# Patient Record
Sex: FEMALE | Race: WHITE | NOT HISPANIC OR LATINO | Employment: FULL TIME | URBAN - METROPOLITAN AREA
[De-identification: names, ages, dates, MRNs, and addresses within clinical notes are randomized per-mention and may not be internally consistent; named-entity substitution may affect disease eponyms.]

---

## 2018-06-10 ENCOUNTER — HOSPITAL ENCOUNTER (EMERGENCY)
Facility: HOSPITAL | Age: 43
Discharge: HOME | End: 2018-06-10
Attending: EMERGENCY MEDICINE
Payer: COMMERCIAL

## 2018-06-10 VITALS
TEMPERATURE: 98 F | SYSTOLIC BLOOD PRESSURE: 134 MMHG | WEIGHT: 205 LBS | HEIGHT: 65 IN | OXYGEN SATURATION: 95 % | BODY MASS INDEX: 34.16 KG/M2 | DIASTOLIC BLOOD PRESSURE: 81 MMHG | RESPIRATION RATE: 16 BRPM | HEART RATE: 87 BPM

## 2018-06-10 DIAGNOSIS — R42 DIZZINESS: ICD-10-CM

## 2018-06-10 DIAGNOSIS — K11.8 PAROTID GLAND FULLNESS: Primary | ICD-10-CM

## 2018-06-10 DIAGNOSIS — M54.2 NECK PAIN ON RIGHT SIDE: ICD-10-CM

## 2018-06-10 LAB
ANION GAP SERPL CALC-SCNC: 12 MMOL/L
BASOPHILS # BLD AUTO: 0.06 K/UL
BASOPHILS NFR BLD: 0.6 %
BUN SERPL-MCNC: 7 MG/DL
CALCIUM SERPL-MCNC: 9.8 MG/DL
CHLORIDE SERPL-SCNC: 105 MMOL/L
CO2 SERPL-SCNC: 22 MMOL/L
CREAT SERPL-MCNC: 0.8 MG/DL
DIFFERENTIAL METHOD: ABNORMAL
EOSINOPHIL # BLD AUTO: 0.5 K/UL
EOSINOPHIL NFR BLD: 5.7 %
ERYTHROCYTE [DISTWIDTH] IN BLOOD BY AUTOMATED COUNT: 12.6 %
EST. GFR  (AFRICAN AMERICAN): >60 ML/MIN/1.73 M^2
EST. GFR  (NON AFRICAN AMERICAN): >60 ML/MIN/1.73 M^2
GLUCOSE SERPL-MCNC: 122 MG/DL
HCT VFR BLD AUTO: 44.8 %
HGB BLD-MCNC: 16.2 G/DL
LYMPHOCYTES # BLD AUTO: 3.1 K/UL
LYMPHOCYTES NFR BLD: 32.7 %
MCH RBC QN AUTO: 34 PG
MCHC RBC AUTO-ENTMCNC: 36.2 G/DL
MCV RBC AUTO: 94 FL
MONOCYTES # BLD AUTO: 1.1 K/UL
MONOCYTES NFR BLD: 11.1 %
NEUTROPHILS # BLD AUTO: 4.7 K/UL
NEUTROPHILS NFR BLD: 49.9 %
PLATELET # BLD AUTO: 168 K/UL
PMV BLD AUTO: 11.2 FL
POTASSIUM SERPL-SCNC: 3.9 MMOL/L
RBC # BLD AUTO: 4.76 M/UL
SODIUM SERPL-SCNC: 139 MMOL/L
WBC # BLD AUTO: 9.43 K/UL

## 2018-06-10 PROCEDURE — 25500020 PHARM REV CODE 255: Performed by: EMERGENCY MEDICINE

## 2018-06-10 PROCEDURE — 99284 EMERGENCY DEPT VISIT MOD MDM: CPT | Mod: 25

## 2018-06-10 PROCEDURE — 93005 ELECTROCARDIOGRAM TRACING: CPT

## 2018-06-10 PROCEDURE — 85025 COMPLETE CBC W/AUTO DIFF WBC: CPT

## 2018-06-10 PROCEDURE — 80048 BASIC METABOLIC PNL TOTAL CA: CPT

## 2018-06-10 PROCEDURE — 93010 ELECTROCARDIOGRAM REPORT: CPT | Mod: ,,, | Performed by: INTERNAL MEDICINE

## 2018-06-10 RX ORDER — HYDROCHLOROTHIAZIDE 12.5 MG/1
12.5 TABLET ORAL DAILY
COMMUNITY
End: 2024-02-22

## 2018-06-10 RX ORDER — CLONIDINE HYDROCHLORIDE 0.1 MG/1
0.1 TABLET ORAL
COMMUNITY

## 2018-06-10 RX ADMIN — IOHEXOL 75 ML: 350 INJECTION, SOLUTION INTRAVENOUS at 02:06

## 2018-06-10 NOTE — ED PROVIDER NOTES
"SCRIBE #1 NOTE: I, Ela Andreso, am scribing for, and in the presence of, Juany Arreola MD. I have scribed the entire note.      History      Chief Complaint   Patient presents with    Dizziness     c/o dizziness, neck pain and swelling       Review of patient's allergies indicates:   Allergen Reactions    Sulfa (sulfonamide antibiotics)         HPI   HPI    6/10/2018, 11:31 AM   History obtained from the patient      History of Present Illness: Jessica Zamarripa is a 43 y.o. female patient with PMHx of HTN who presents to the Emergency Department for mass to R side of neck which onset gradually 6 months ago. Patient reports mass causes episodes of dizziness. Patient reports feeling dizzy at work today and her employers recommended she be evaluated in the ED. Symptoms are constant and moderate in severity. The patient describes the sxs as "feels like the knot tightens and makes me dizzy". No mitigating or exacerbating factors reported. Associated sxs include dizziness and R sided neck pain. Patient denies any neck pain, vision changes, unilateral numbness or weakness, fever, chills, HA, CP, SOB, difficulty swallowing, neck stiffness, increase in size of mass, weight change, and all other sxs at this time. Patient reports daily use of cigarettes. No further complaints or concerns at this time.     Arrival mode: Personal vehicle      PCP: Alberto Warren MD     Past Medical History:  Hypertension      Past Surgical History:  Past surgical history reviewed not relevant      Family History:  Family history reviewed not relevant      Social History:  Social History    Social History Main Topics    Social History Main Topics    Smoking status: Unknown if ever smoked    Smokeless tobacco: Unknown if ever used    Alcohol Use: Unknown drinking history    Drug Use: Unknown if ever used    Sexual Activity: Unknown       ROS   Review of Systems   Constitutional: Negative for chills, fever and unexpected weight change. "   HENT: Negative for sore throat and trouble swallowing.    Respiratory: Negative for shortness of breath.    Cardiovascular: Negative for chest pain.   Gastrointestinal: Negative for nausea.   Genitourinary: Negative for dysuria.   Musculoskeletal: Positive for neck pain (R sided). Negative for back pain and neck stiffness.        (+) mass to R side of neck  (-) increase in size of mass   Skin: Negative for rash.   Neurological: Positive for dizziness. Negative for weakness and headaches.   Hematological: Does not bruise/bleed easily.   All other systems reviewed and are negative.      Physical Exam      Initial Vitals [06/10/18 1049]   BP Pulse Resp Temp SpO2   (!) 162/87 95 18 97.6 °F (36.4 °C) 98 %      MAP       112          Physical Exam  Nursing Notes and Vital Signs Reviewed.  Constitutional: Patient is in no acute distress. Well-developed and well-nourished.  Head: Atraumatic. Normocephalic.  Eyes: PERRL. EOM intact. Conjunctivae are not pale. No scleral icterus.  ENT: Mucous membranes are moist. Oropharynx is clear and symmetric.    Neck: Supple. Full ROM. No lymphadenopathy. No palpable masses.  Cardiovascular: Regular rate. Regular rhythm. No murmurs, rubs, or gallops. Distal pulses are 2+ and symmetric.  Pulmonary/Chest: No respiratory distress. Clear to auscultation bilaterally. No wheezing or rales.  Abdominal: Soft and non-distended.  There is no tenderness.  No rebound, guarding, or rigidity. Good bowel sounds.  Genitourinary: No CVA tenderness  Musculoskeletal: Moves all extremities. No obvious deformities. No edema. No calf tenderness.  Skin: Warm and dry.  Neurological:  Alert, awake, and appropriate.  Normal speech.  No acute focal neurological deficits are appreciated.  Psychiatric: Normal affect. Good eye contact. Appropriate in content.    ED Course    Procedures  ED Vital Signs:  Vitals:    06/10/18 1049 06/10/18 1120 06/10/18 1122 06/10/18 1130   BP: (!) 162/87 120/69  120/69   Pulse: 95 89  "70 91   Resp: 18      Temp: 97.6 °F (36.4 °C)      TempSrc: Oral      SpO2: 98% 96%  (!) 94%   Weight: 93 kg (205 lb)      Height: 5' 5" (1.651 m)       06/10/18 1420 06/10/18 1500   BP: (!) 148/87 134/81   Pulse: 88 87   Resp:  16   Temp:  97.5 °F (36.4 °C)   TempSrc:     SpO2: 96% 95%   Weight:     Height:         Abnormal Lab Results:  Labs Reviewed   CBC W/ AUTO DIFFERENTIAL - Abnormal; Notable for the following:        Result Value    Hemoglobin 16.2 (*)     MCH 34.0 (*)     MCHC 36.2 (*)     Mono # 1.1 (*)     All other components within normal limits   BASIC METABOLIC PANEL - Abnormal; Notable for the following:     CO2 22 (*)     Glucose 122 (*)     All other components within normal limits        All Lab Results:  Results for orders placed or performed during the hospital encounter of 06/10/18   CBC auto differential   Result Value Ref Range    WBC 9.43 3.90 - 12.70 K/uL    RBC 4.76 4.00 - 5.40 M/uL    Hemoglobin 16.2 (H) 12.0 - 16.0 g/dL    Hematocrit 44.8 37.0 - 48.5 %    MCV 94 82 - 98 fL    MCH 34.0 (H) 27.0 - 31.0 pg    MCHC 36.2 (H) 32.0 - 36.0 g/dL    RDW 12.6 11.5 - 14.5 %    Platelets 168 150 - 350 K/uL    MPV 11.2 9.2 - 12.9 fL    Gran # (ANC) 4.7 1.8 - 7.7 K/uL    Lymph # 3.1 1.0 - 4.8 K/uL    Mono # 1.1 (H) 0.3 - 1.0 K/uL    Eos # 0.5 0.0 - 0.5 K/uL    Baso # 0.06 0.00 - 0.20 K/uL    Gran% 49.9 38.0 - 73.0 %    Lymph% 32.7 18.0 - 48.0 %    Mono% 11.1 4.0 - 15.0 %    Eosinophil% 5.7 0.0 - 8.0 %    Basophil% 0.6 0.0 - 1.9 %    Differential Method Automated    Basic metabolic panel   Result Value Ref Range    Sodium 139 136 - 145 mmol/L    Potassium 3.9 3.5 - 5.1 mmol/L    Chloride 105 95 - 110 mmol/L    CO2 22 (L) 23 - 29 mmol/L    Glucose 122 (H) 70 - 110 mg/dL    BUN, Bld 7 6 - 20 mg/dL    Creatinine 0.8 0.5 - 1.4 mg/dL    Calcium 9.8 8.7 - 10.5 mg/dL    Anion Gap 12 8 - 16 mmol/L    eGFR if African American >60 >60 mL/min/1.73 m^2    eGFR if non African American >60 >60 mL/min/1.73 m^2 "       Imaging Results:  Imaging Results          CT Soft Tissue Neck W WO Contrast (Final result)  Result time 06/10/18 14:54:52    Final result by Lacho Spicer III, MD (06/10/18 14:54:52)                 Impression:      1.  AS ABOVE.  THE AIRWAY IS GROSSLY UNREMARKABLE.    2.  SLIGHT FULLNESS IN THE PAROTIDS, PERHAPS SLIGHTLY GREATER ON THE RIGHT.  MINIMAL HAZINESS ANTERIORLY.  SCATTERED LYMPH NODES.  NO SUSPICIOUS MASS.  NO UNUSUAL FLUID COLLECTION.  I DO NOT KNOW THE EXACT SITE OF CONCERN, PLEASE CORRELATE.    All CT scans at this facility use dose modulation, iterative reconstruction and/or weight based dosing when appropriate to reduce radiation dose to as low as reasonably achievable.      Electronically signed by: Lacho Spicer MD  Date:    06/10/2018  Time:    14:54             Narrative:    EXAMINATION:  CT SOFT TISSUE NECK W WO CONTRAST    CLINICAL HISTORY:  Neck mass, nonpulsatile, solitary;    TECHNIQUE:  MULTIPLANAR IMAGING SUBMITTED    COMPARISON:  NONE    FINDINGS:  MULTIPLANAR IMAGING THROUGH THE NECK.  NO GROSS ACUTE ABNORMALITY AT THE LUNG APICES.  EMPHYSEMATOUS CHANGE NOTED GREATER ON THE RIGHT.  SUPERIOR ASPECT OF THE MEDIASTINUM SHOWS NO ACUTE FINDING.  SUBMANDIBULAR AND ARE RELATIVELY SYMMETRIC.  THERE IS SCATTERED SMALL NODES IN THE NECK BILATERALLY LARGEST MEASURING UP TO BETWEEN 8 AND 9 MM IN SHORT AXIS.  THERE IS SLIGHT FULLNESS ALONG THE PAROTID GLANDS BILATERALLY WITH MINIMAL HAZINESS ANTERIORLY ALONG BOTH PAROTID.  THERE SMALL LYMPH NODES IDENTIFIED.  NO DEFINITE SUSPICIOUS MASS IS SEEN.  I DO NOT KNOW THE EXACT SITE OF INTEREST.  THIS WAS NOT MARKED ON THE CT SCAN.    NO ADDITIONAL SIGNIFICANT FINDINGS.  NASOPHARYNX IS UNREMARKABLE.  OROPHARYNX IS UNREMARKABLE.  EPIGLOTTIS IS NORMAL.  NO ABNORMALITY OF THE FALSE OR TRUE CORDS.                               The EKG was ordered, reviewed, and independently interpreted by the ED provider.  Interpretation time: 1114  Rate: 89  BPM  Rhythm: normal sinus rhythm  Interpretation: Prolonged QT. Abnormal ECG. No STEMI.         The Emergency Provider reviewed the vital signs and test results, which are outlined above.    ED Discussion     3:05 PM: Reassessed pt at this time. Discussed with pt all pertinent ED information and results. Discussed pt dx and plan of tx. Gave pt all f/u and return to the ED instructions. All questions and concerns were addressed at this time. Pt expresses understanding of information and instructions, and is comfortable with plan to discharge. Pt is stable for discharge.  Advised patient f/u with ENT.    I discussed with patient and/or family/caretaker that evaluation in the ED does not suggest any emergent or life threatening medical conditions requiring immediate intervention beyond what was provided in the ED, and I believe patient is safe for discharge.  Regardless, an unremarkable evaluation in the ED does not preclude the development or presence of a serious of life threatening condition. As such, patient was instructed to return immediately for any worsening or change in current symptoms.      ED Medication(s):  Medications   omnipaque 350 iohexol 75 mL (75 mLs Intravenous Given 6/10/18 1407)       New Prescriptions    No medications on file       Follow-up Information     Alberto Warren MD. Schedule an appointment as soon as possible for a visit in 2 days.    Specialty:  Internal Medicine  Why:  Return to the Emergency Room, If symptoms worsen  Contact information:  [id number] [address]  [phone]                     Medical Decision Making    Medical Decision Making:   Clinical Tests:   Lab Tests: Ordered and Reviewed  Radiological Study: Ordered and Reviewed  Medical Tests: Ordered and Reviewed           Scribe Attestation:   Scribe #1: I performed the above scribed service and the documentation accurately describes the services I performed. I attest to the accuracy of the note.    Attending:    Physician Attestation Statement for Scribe #1: I, Juany Arreola MD, personally performed the services described in this documentation, as scribed by Ela Nava, in my presence, and it is both accurate and complete.          Clinical Impression       ICD-10-CM ICD-9-CM   1. Parotid gland fullness K11.8 527.8   2. Dizziness R42 780.4   3. Neck pain on right side M54.2 723.1       Disposition:   Disposition: Discharged  Condition: Stable         Juany Arreola MD  06/10/18 3670

## 2018-06-10 NOTE — ED NOTES
"The pt reports having a lump inside the right side of her neck. " When the lump gets tight It pushes on my carotid and I get dizzy" pt reports it has been there for six months and today the dizziness is worse.  "

## 2024-02-22 PROBLEM — E87.6 HYPOKALEMIA: Status: ACTIVE | Noted: 2024-02-22

## 2024-02-22 PROBLEM — I10 HTN (HYPERTENSION): Status: ACTIVE | Noted: 2024-02-22

## 2024-02-22 PROBLEM — N13.9 OBSTRUCTIVE UROPATHY: Status: ACTIVE | Noted: 2024-02-22

## 2024-02-22 PROBLEM — N20.0 STAGHORN CALCULUS: Status: ACTIVE | Noted: 2024-02-22

## 2024-02-27 PROBLEM — Z46.6 FITTING AND ADJUSTMENT OF URINARY DEVICE: Status: ACTIVE | Noted: 2024-02-27

## 2024-09-08 ENCOUNTER — HOSPITAL ENCOUNTER (EMERGENCY)
Facility: HOSPITAL | Age: 49
Discharge: HOME | End: 2024-09-09
Attending: STUDENT IN AN ORGANIZED HEALTH CARE EDUCATION/TRAINING PROGRAM
Payer: COMMERCIAL

## 2024-09-08 VITALS
HEART RATE: 70 BPM | RESPIRATION RATE: 14 BRPM | WEIGHT: 189.81 LBS | BODY MASS INDEX: 31.63 KG/M2 | SYSTOLIC BLOOD PRESSURE: 141 MMHG | HEIGHT: 65 IN | DIASTOLIC BLOOD PRESSURE: 93 MMHG | OXYGEN SATURATION: 96 % | TEMPERATURE: 98 F

## 2024-09-08 DIAGNOSIS — E87.6 HYPOKALEMIA: ICD-10-CM

## 2024-09-08 DIAGNOSIS — N12 PYELONEPHRITIS OF LEFT KIDNEY: Primary | ICD-10-CM

## 2024-09-08 LAB
ALBUMIN SERPL-MCNC: 3.6 G/DL (ref 3.5–5)
ALBUMIN/GLOB SERPL: 0.8 RATIO (ref 1.1–2)
ALP SERPL-CCNC: 135 UNIT/L (ref 40–150)
ALT SERPL-CCNC: 27 UNIT/L (ref 0–55)
AMORPH URATE CRY URNS QL MICRO: ABNORMAL /HPF
ANION GAP SERPL CALC-SCNC: 10 MEQ/L
AST SERPL-CCNC: 31 UNIT/L (ref 5–34)
BACTERIA #/AREA URNS AUTO: ABNORMAL /HPF
BASOPHILS # BLD AUTO: 0.05 X10(3)/MCL
BASOPHILS NFR BLD AUTO: 1 %
BILIRUB SERPL-MCNC: 0.5 MG/DL
BILIRUB UR QL STRIP.AUTO: NEGATIVE
BUN SERPL-MCNC: 8 MG/DL (ref 9.8–20.1)
CALCIUM SERPL-MCNC: 9.6 MG/DL (ref 8.4–10.2)
CAOX CRY UR QL COMP ASSIST: ABNORMAL
CHLORIDE SERPL-SCNC: 107 MMOL/L (ref 98–107)
CLARITY UR: ABNORMAL
CO2 SERPL-SCNC: 23 MMOL/L (ref 22–29)
COLOR UR AUTO: ABNORMAL
CREAT SERPL-MCNC: 0.87 MG/DL (ref 0.55–1.02)
CREAT/UREA NIT SERPL: 9
EOSINOPHIL # BLD AUTO: 0.39 X10(3)/MCL (ref 0–0.9)
EOSINOPHIL NFR BLD AUTO: 7.6 %
ERYTHROCYTE [DISTWIDTH] IN BLOOD BY AUTOMATED COUNT: 12.7 % (ref 11.5–17)
GFR SERPLBLD CREATININE-BSD FMLA CKD-EPI: >60 ML/MIN/1.73/M2
GLOBULIN SER-MCNC: 4.5 GM/DL (ref 2.4–3.5)
GLUCOSE SERPL-MCNC: 127 MG/DL (ref 74–100)
GLUCOSE UR QL STRIP: NEGATIVE
HCT VFR BLD AUTO: 41.4 % (ref 37–47)
HGB BLD-MCNC: 14.3 G/DL (ref 12–16)
HGB UR QL STRIP: ABNORMAL
IMM GRANULOCYTES # BLD AUTO: 0.01 X10(3)/MCL (ref 0–0.04)
IMM GRANULOCYTES NFR BLD AUTO: 0.2 %
KETONES UR QL STRIP: NEGATIVE
LEUKOCYTE ESTERASE UR QL STRIP: ABNORMAL
LYMPHOCYTES # BLD AUTO: 1.11 X10(3)/MCL (ref 0.6–4.6)
LYMPHOCYTES NFR BLD AUTO: 21.6 %
MCH RBC QN AUTO: 31.5 PG (ref 27–31)
MCHC RBC AUTO-ENTMCNC: 34.5 G/DL (ref 33–36)
MCV RBC AUTO: 91.2 FL (ref 80–94)
MONOCYTES # BLD AUTO: 0.5 X10(3)/MCL (ref 0.1–1.3)
MONOCYTES NFR BLD AUTO: 9.7 %
NEUTROPHILS # BLD AUTO: 3.07 X10(3)/MCL (ref 2.1–9.2)
NEUTROPHILS NFR BLD AUTO: 59.9 %
NITRITE UR QL STRIP: POSITIVE
PH UR STRIP: 6 [PH]
PLATELET # BLD AUTO: 101 X10(3)/MCL (ref 130–400)
PMV BLD AUTO: 12.3 FL (ref 7.4–10.4)
POTASSIUM SERPL-SCNC: 2.8 MMOL/L (ref 3.5–5.1)
PROT SERPL-MCNC: 8.1 GM/DL (ref 6.4–8.3)
PROT UR QL STRIP: ABNORMAL
RBC # BLD AUTO: 4.54 X10(6)/MCL (ref 4.2–5.4)
RBC #/AREA URNS AUTO: ABNORMAL /HPF
SODIUM SERPL-SCNC: 140 MMOL/L (ref 136–145)
SP GR UR STRIP.AUTO: 1.02 (ref 1–1.03)
SQUAMOUS #/AREA URNS AUTO: ABNORMAL /HPF
UROBILINOGEN UR STRIP-ACNC: 0.2
WBC # BLD AUTO: 5.13 X10(3)/MCL (ref 4.5–11.5)
WBC #/AREA URNS AUTO: >100 /HPF

## 2024-09-08 PROCEDURE — 96361 HYDRATE IV INFUSION ADD-ON: CPT

## 2024-09-08 PROCEDURE — 63600175 PHARM REV CODE 636 W HCPCS: Performed by: STUDENT IN AN ORGANIZED HEALTH CARE EDUCATION/TRAINING PROGRAM

## 2024-09-08 PROCEDURE — 99285 EMERGENCY DEPT VISIT HI MDM: CPT | Mod: 25

## 2024-09-08 PROCEDURE — 96367 TX/PROPH/DG ADDL SEQ IV INF: CPT

## 2024-09-08 PROCEDURE — 96365 THER/PROPH/DIAG IV INF INIT: CPT

## 2024-09-08 PROCEDURE — 25000003 PHARM REV CODE 250: Performed by: STUDENT IN AN ORGANIZED HEALTH CARE EDUCATION/TRAINING PROGRAM

## 2024-09-08 PROCEDURE — 87184 SC STD DISK METHOD PER PLATE: CPT | Performed by: STUDENT IN AN ORGANIZED HEALTH CARE EDUCATION/TRAINING PROGRAM

## 2024-09-08 PROCEDURE — 85025 COMPLETE CBC W/AUTO DIFF WBC: CPT | Performed by: STUDENT IN AN ORGANIZED HEALTH CARE EDUCATION/TRAINING PROGRAM

## 2024-09-08 PROCEDURE — 80053 COMPREHEN METABOLIC PANEL: CPT | Performed by: STUDENT IN AN ORGANIZED HEALTH CARE EDUCATION/TRAINING PROGRAM

## 2024-09-08 PROCEDURE — 96375 TX/PRO/DX INJ NEW DRUG ADDON: CPT

## 2024-09-08 PROCEDURE — 81001 URINALYSIS AUTO W/SCOPE: CPT | Performed by: STUDENT IN AN ORGANIZED HEALTH CARE EDUCATION/TRAINING PROGRAM

## 2024-09-08 RX ORDER — KETOROLAC TROMETHAMINE 30 MG/ML
30 INJECTION, SOLUTION INTRAMUSCULAR; INTRAVENOUS
Status: COMPLETED | OUTPATIENT
Start: 2024-09-08 | End: 2024-09-08

## 2024-09-08 RX ORDER — POTASSIUM CHLORIDE 20 MEQ/1
40 TABLET, EXTENDED RELEASE ORAL
Status: COMPLETED | OUTPATIENT
Start: 2024-09-08 | End: 2024-09-08

## 2024-09-08 RX ORDER — SODIUM CHLORIDE 9 MG/ML
INJECTION, SOLUTION INTRAVENOUS CONTINUOUS
Status: DISCONTINUED | OUTPATIENT
Start: 2024-09-08 | End: 2024-09-09 | Stop reason: HOSPADM

## 2024-09-08 RX ORDER — POTASSIUM CHLORIDE 7.45 MG/ML
10 INJECTION INTRAVENOUS
Status: COMPLETED | OUTPATIENT
Start: 2024-09-08 | End: 2024-09-08

## 2024-09-08 RX ORDER — CEFPODOXIME PROXETIL 100 MG/1
200 TABLET, FILM COATED ORAL EVERY 12 HOURS
Qty: 56 TABLET | Refills: 0 | Status: SHIPPED | OUTPATIENT
Start: 2024-09-08 | End: 2024-09-22

## 2024-09-08 RX ADMIN — POTASSIUM CHLORIDE 10 MEQ: 7.46 INJECTION, SOLUTION INTRAVENOUS at 10:09

## 2024-09-08 RX ADMIN — POTASSIUM CHLORIDE 40 MEQ: 1500 TABLET, EXTENDED RELEASE ORAL at 08:09

## 2024-09-08 RX ADMIN — CEFTRIAXONE SODIUM 1 G: 1 INJECTION, POWDER, FOR SOLUTION INTRAMUSCULAR; INTRAVENOUS at 09:09

## 2024-09-08 RX ADMIN — POTASSIUM CHLORIDE 10 MEQ: 7.46 INJECTION, SOLUTION INTRAVENOUS at 09:09

## 2024-09-08 RX ADMIN — KETOROLAC TROMETHAMINE 30 MG: 30 INJECTION, SOLUTION INTRAMUSCULAR at 08:09

## 2024-09-08 RX ADMIN — SODIUM CHLORIDE: 9 INJECTION, SOLUTION INTRAVENOUS at 09:09

## 2024-09-08 RX ADMIN — SODIUM CHLORIDE, POTASSIUM CHLORIDE, SODIUM LACTATE AND CALCIUM CHLORIDE 1000 ML: 600; 310; 30; 20 INJECTION, SOLUTION INTRAVENOUS at 07:09

## 2024-09-08 NOTE — Clinical Note
"Jessica Laird (Elizabeth)js was seen and treated in our emergency department on 9/8/2024.  She may return to work on 09/10/2024.       If you have any questions or concerns, please don't hesitate to call.       RN    "

## 2024-09-08 NOTE — Clinical Note
"Jessica Laird (Elizabeth)js was seen and treated in our emergency department on 9/8/2024.  She may return to work on 09/11/2024.       If you have any questions or concerns, please don't hesitate to call.       RN    "

## 2024-09-08 NOTE — Clinical Note
"Jessica Laird (Elizabeth)js was seen and treated in our emergency department on 9/8/2024.  She may return to work on 09/09/2024.       If you have any questions or concerns, please don't hesitate to call.       RN    "

## 2024-09-09 NOTE — ED PROVIDER NOTES
Encounter Date: 9/8/2024       History     Chief Complaint   Patient presents with    Abdominal Pain     Patient c/o possible kidney stones with abdominal pain.      HPI    49-year-old female with a past medical history of hypertension, recurrent urolithiasis, type 2 diabetes presents emergency department for left-sided flank pain that started a few hours before presentation.  Patient is concerned for another kidney stone.  States the pain caused her to have nausea with no vomiting.  She also states that she is currently being worked up for possible bladder cancer.    Review of patient's allergies indicates:   Allergen Reactions    Morphine Hives and Itching    Ozempic [semaglutide]     Statins-hmg-coa reductase inhibitors      Cramping    Sulfa (sulfonamide antibiotics)      Past Medical History:   Diagnosis Date    Diabetes mellitus     a1c 7    HTN (hypertension)     Kidney stone     Muscle cramps     Pancreatitis     Urinary tract infection, site not specified      Past Surgical History:   Procedure Laterality Date    CHOLECYSTECTOMY      COLONOSCOPY      CYSTOSCOPY W/ RETROGRADES Bilateral 02/22/2024    Procedure: CYSTOSCOPY, WITH RETROGRADE PYELOGRAM;  Surgeon: Josue Melo MD;  Location: [facility] OR;  Service: Urology;  Laterality: Bilateral;    CYSTOSCOPY W/ URETERAL STENT REMOVAL Left 03/07/2024    Procedure: CYSTOSCOPY, WITH URETERAL STENT REMOVAL;  Surgeon: Josue Melo MD;  Location: [facility] OR;  Service: Urology;  Laterality: Left;    CYSTOSCOPY W/ URETERAL STENT REMOVAL Left 4/4/2024    Procedure: CYSTOSCOPY, WITH URETERAL STENT REMOVAL;  Surgeon: Josue Melo MD;  Location: [facility] OR;  Service: Urology;  Laterality: Left;    CYSTOSCOPY WITH URETEROSCOPY, RETROGRADE PYELOGRAPHY, AND INSERTION OF STENT N/A 03/13/2024    Procedure: CYSTOSCOPY, WITH RETROGRADE PYELOGRAM AND URETERAL STENT INSERTION;  Surgeon: Josue Melo MD;  Location: [facility] OR;  Service: Urology;  Laterality: N/A;     DILATION OF URETHRA N/A 02/22/2024    Procedure: DILATION, URETHRA;  Surgeon: Josue Melo MD;  Location: [facility] OR;  Service: Urology;  Laterality: N/A;    EXTRACORPOREAL SHOCK WAVE LITHOTRIPSY Left 03/07/2024    Procedure: LITHOTRIPSY, ESWL;  Surgeon: Josue Melo MD;  Location: [facility] OR;  Service: Urology;  Laterality: Left;    LASER LITHOTRIPSY Left 02/22/2024    Procedure: LITHOTRIPSY, USING LASER;  Surgeon: Josue Melo MD;  Location: [facility] OR;  Service: Urology;  Laterality: Left;    LASER LITHOTRIPSY N/A 03/13/2024    Procedure: LITHOTRIPSY, USING LASER;  Surgeon: Josue Melo MD;  Location: [facility] OR;  Service: Urology;  Laterality: N/A;    URETERAL STENT PLACEMENT Left 02/22/2024    Procedure: INSERTION, STENT, URETER;  Surgeon: Josue Melo MD;  Location: [facility] OR;  Service: Urology;  Laterality: Left;    URETEROSCOPY Left 02/22/2024    Procedure: URETEROSCOPY;  Surgeon: Josue Melo MD;  Location: [facility] OR;  Service: Urology;  Laterality: Left;     Family History   Adopted: Yes     Social History     Tobacco Use    Smoking status: Every Day     Current packs/day: 0.50     Types: Cigarettes     Passive exposure: Never    Smokeless tobacco: Never    Tobacco comments:     20 years   Substance Use Topics    Alcohol use: Not Currently    Drug use: Never     Review of Systems   Constitutional:  Negative for fever.   Respiratory:  Negative for cough.    Cardiovascular:  Negative for chest pain.   Gastrointestinal:  Positive for abdominal pain and nausea. Negative for constipation, diarrhea and vomiting.   Genitourinary:  Positive for frequency.   Neurological:  Negative for headaches.   All other systems reviewed and are negative.      Physical Exam     Initial Vitals [09/08/24 1918]   BP Pulse Resp Temp SpO2   (!) 153/82 96 16 98.4 °F (36.9 °C) 96 %      MAP       --         Physical Exam    Nursing note and vitals reviewed.  Constitutional: She appears well-developed and  well-nourished. No distress.   Cardiovascular:  Normal rate and regular rhythm.           Pulmonary/Chest: Breath sounds normal. No respiratory distress. She has no wheezes. She has no rhonchi. She has no rales.   Abdominal: Abdomen is soft. There is no abdominal tenderness.   No right CVA tenderness.  There is left CVA tenderness. There is no rebound and no guarding.   Musculoskeletal:         General: No tenderness. Normal range of motion.     Neurological: She is alert and oriented to person, place, and time. She has normal strength.   Skin: Skin is warm. Capillary refill takes less than 2 seconds.         ED Course   Procedures  Labs Reviewed   COMPREHENSIVE METABOLIC PANEL - Abnormal       Result Value    Sodium 140      Potassium 2.8 (*)     Chloride 107      CO2 23      Glucose 127 (*)     Blood Urea Nitrogen 8.0 (*)     Creatinine 0.87      Calcium 9.6      Protein Total 8.1      Albumin 3.6      Globulin 4.5 (*)     Albumin/Globulin Ratio 0.8 (*)     Bilirubin Total 0.5            ALT 27      AST 31      eGFR >60      Anion Gap 10.0      BUN/Creatinine Ratio 9     URINALYSIS, REFLEX TO URINE CULTURE - Abnormal    Color, UA Dark Yellow      Appearance, UA Cloudy (*)     Specific Gravity, UA 1.025      pH, UA 6.0      Protein, UA 1+ (*)     Glucose, UA Negative      Ketones, UA Negative      Blood, UA 1+ (*)     Bilirubin, UA Negative      Urobilinogen, UA 0.2      Nitrites, UA Positive (*)     Leukocyte Esterase, UA 2+ (*)    CBC WITH DIFFERENTIAL - Abnormal    WBC 5.13      RBC 4.54      Hgb 14.3      Hct 41.4      MCV 91.2      MCH 31.5 (*)     MCHC 34.5      RDW 12.7      Platelet 101 (*)     MPV 12.3 (*)     Neut % 59.9      Lymph % 21.6      Mono % 9.7      Eos % 7.6      Basophil % 1.0      Lymph # 1.11      Neut # 3.07      Mono # 0.50      Eos # 0.39      Baso # 0.05      IG# 0.01      IG% 0.2     URINALYSIS, MICROSCOPIC - Abnormal    Bacteria, UA Few (*)     Amporphous Urate Crystals, UA  Occasional (*)     Calcium Oxalate Crystals, UA Occasional (*)     RBC, UA 3-5      WBC, UA >100 (*)     Squamous Epithelial Cells, UA Rare     CULTURE, URINE   CBC W/ AUTO DIFFERENTIAL    Narrative:     The following orders were created for panel order CBC auto differential.  Procedure                               Abnormality         Status                     ---------                               -----------         ------                     CBC with Differential[[id number]]       Abnormal            Final result                 Please view results for these tests on the individual orders.          Imaging Results              CT Abdomen Pelvis  Without Contrast (Final result)  Result time 09/08/24 20:45:12      Final result by Urbano Perez MD (09/08/24 20:45:12)                   Impression:      1. Findings of cirrhosis with stigmata of portal hypertension.  2. Nonobstructing calculi involving the left kidney.  3. Other secondary findings as noted.      Electronically signed by: Urbano Perez MD  Date:    09/08/2024  Time:    20:45               Narrative:    EXAMINATION:  CT ABDOMEN PELVIS WITHOUT CONTRAST    CLINICAL HISTORY:  Flank pain, kidney stone suspected;    TECHNIQUE:  Multiple cross-section obtained the lung bases the pubic symphysis without the use of contrast.  Coronal and sagittal reformatted images were obtained.  An automated dose exposure technique was utilized this limits radiation does the patient.    COMPARISON:  04/09/2024    FINDINGS:  Dependent atelectatic changes lungs with mosaic attenuation as well as parenchymal scarring.  Heart size within normal limits with coronary calcifications.  Calcified lymph nodes are noted.    Evaluation of hollow and solid viscera is limited secondary to technique    The liver demonstrates a nodular contour with trace perihepatic fluid consistent with cirrhotic morphology and sequela of portal hypertension with scattered calcified granulomas of the  spleen.  Gamma Nada bodies not entirely excluded.  Gallbladder is surgically absent.  The spleen and pancreas are normal.  Kidneys are symmetric in size with nonobstructing calculi involving the lower pole the left kidney.  No evidence for hydronephrosis.    Small hiatal hernia.  Small bowel is normal caliber.  Colon is normal caliber with scattered colonic diverticula.  No adjacent inflammatory changes.  Normal appendix.    Uterus is anteverted without evidence for adnexal mass.  Bladder is under distended with wall thickening.  Course and caliber of the abdominal is normal scattered calcified atheromatous disease.  Splenules are identified.  No definitive adenopathy.  Multiple varices are identified throughout the abdominal cavity especially in the region of the umbilical vein.    No suspicious osseous lesions.  Scattered spondylotic changes are identified.                                       Medications   cefTRIAXone (Rocephin) 1 g in D5W 100 mL IVPB (MB+) (0 g Intravenous Stopped 9/8/24 2132)   0.9%  NaCl infusion ( Intravenous New Bag 9/8/24 2136)   lactated ringers bolus 1,000 mL (0 mLs Intravenous Stopped 9/8/24 2053)   ketorolac injection 30 mg (30 mg Intravenous Given 9/8/24 2046)   potassium chloride 10 mEq in 100 mL IVPB (10 mEq Intravenous New Bag 9/8/24 2249)   potassium chloride SA CR tablet 40 mEq (40 mEq Oral Given 9/8/24 2047)     Medical Decision Making  Initial Assessment:       Flank pain      Differential Diagnosis:   Judging by the patient's chief complaint and pertinent history, the patient has the following possible differential diagnoses, including but not limited to the following.  Some of these are deemed to be lower likelihood and some more likely based on my physical exam and history combined with possible lab work and/or imaging studies.   Please see the pertinent studies, and refer to the HPI.  Some of these diagnoses will take further evaluation to fully rule out, perhaps as an  outpatient and the patient was encouraged to follow up when discharged for more comprehensive evaluation.      Urolithiasis, UTI, pyelonephritis, diverticulitis, intra-abdominal infection, constipation,  as well as multiple other possible etiologies      Problems Addressed:  Hypokalemia: chronic illness or injury  Pyelonephritis of left kidney: acute illness or injury    Amount and/or Complexity of Data Reviewed  Labs: ordered. Decision-making details documented in ED Course.  Radiology: ordered.    Risk  Prescription drug management.               ED Course as of 09/08/24 2257   Sun Sep 08, 2024   2004 WBC: 5.13 [BS]   2004 Hemoglobin: 14.3 [BS]   2004 Hematocrit: 41.4 [BS]   2004 Platelet Count(!): 101 [BS]   2010 Leukocyte Esterase, UA(!): 2+ [BS]   2010 NITRITE UA(!): Positive [BS]   2010 Blood, UA(!): 1+ [BS]   2254 Patient received a total of 60 mEq of potassium.  She does not want to repeat lab because of hard IV draws.  She states she will follow up with PCP. [BS]      ED Course User Index  [BS] Rocco Cortes MD                           Clinical Impression:  Final diagnoses:  [N12] Pyelonephritis of left kidney (Primary)  [E87.6] Hypokalemia          ED Disposition Condition    Discharge Stable          ED Prescriptions       Medication Sig Dispense Start Date End Date Auth. Provider    cefpodoxime (VANTIN) 100 MG tablet Take 2 tablets (200 mg total) by mouth every 12 (twelve) hours. for 14 days 56 tablet 9/8/2024 9/22/2024 Rocco Cortes MD          Follow-up Information       Follow up With Specialties Details Why Contact Info    Alberto Warren MD Internal Medicine Schedule an appointment as soon as possible for a visit   [address] [place] [address]  [phone]      [facility] - Emergency Dept Emergency Medicine Go to  If symptoms worsen [address] [place] [id number] [phone]             Rocco Cortes MD  09/08/24 2255

## 2024-09-11 LAB — BACTERIA UR CULT: ABNORMAL

## 2024-09-14 ENCOUNTER — TELEPHONE (OUTPATIENT)
Dept: EMERGENCY MEDICINE | Facility: HOSPITAL | Age: 49
End: 2024-09-14
Payer: COMMERCIAL